# Patient Record
(demographics unavailable — no encounter records)

---

## 2024-10-10 NOTE — REVIEW OF SYSTEMS
[Xerostomia: Grade 0] : Xerostomia: Grade 0 [Oral Pain: Grade 0] : Oral Pain: Grade 0 [Dysgeusia: Grade 0] : Dysgeusia: Grade 0 [Headache: Grade 0] : Headache: Grade 0 [Lethargy: Grade 0] : Lethargy: Grade 0 [Cough: Grade 0] : Cough: Grade 0 [Dyspnea: Grade 0] : Dyspnea: Grade 0 [Hoarseness: Grade 0] : Hoarseness: Grade 0 [Skin Hyperpigmentation: Grade 0] : Skin Hyperpigmentation: Grade 0 [Dermatitis Radiation: Grade 0] : Dermatitis Radiation: Grade 0 [Negative] : Allergic/Immunologic [Tinnitus: Grade 1 - Mild symptoms; intervention not indicated] : Tinnitus: Grade 1 - Mild symptoms; intervention not indicated

## 2024-10-16 NOTE — HISTORY OF PRESENT ILLNESS
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   Patient first noticed a firm raised lesion on the left ear in fall 2023 that had occasional bleeding.  11/6/2023 2 stage Mohs procedure biopsy of left antihelix final path: squamous cell carcinoma Class 1 with PNI. Margins were negative. Need official report in chart.  1/25/24 CXR [facility] 1/25/24 neck US: IMPRESSION: Unremarkable soft tissue neck ultrasound  1/30/24 initial radiation consult: Patient doing well this visit, no major complaints.  2/1/2024 CT Neck Soft Tissue: Postoperative change at left auricle along antitragus, without nodular enhancement of concern. No cervical lymphadenopathy.  4/9/2024 Completed planned RT to left ear total dose of 5000cGy in 20 fractions.   9/30/2024 CT Neck Soft Tissue: IMPRESSION: Stable interval follow-up CT examination without evidence of neoplastic disease progression nor recurrence. No new or enlarging cervical lymph nodes.  9/30/2024 CT Chest: IMPRESSION: Stable exam  10/10/24 Presents today in follow up. Patient noted occasional tinnitus, and pressure in left ear since last month. Denies pain. Generally doing well.

## 2025-04-03 NOTE — REVIEW OF SYSTEMS
[Negative] : Allergic/Immunologic [Tinnitus: Grade 1 - Mild symptoms; intervention not indicated] : Tinnitus: Grade 1 - Mild symptoms; intervention not indicated [Xerostomia: Grade 0] : Xerostomia: Grade 0 [Oral Pain: Grade 0] : Oral Pain: Grade 0 [Dysgeusia: Grade 0] : Dysgeusia: Grade 0 [Headache: Grade 0] : Headache: Grade 0 [Lethargy: Grade 0] : Lethargy: Grade 0 [Cough: Grade 0] : Cough: Grade 0 [Dyspnea: Grade 0] : Dyspnea: Grade 0 [Hoarseness: Grade 0] : Hoarseness: Grade 0 [Skin Hyperpigmentation: Grade 0] : Skin Hyperpigmentation: Grade 0 [Dermatitis Radiation: Grade 0] : Dermatitis Radiation: Grade 0

## 2025-04-10 NOTE — HISTORY OF PRESENT ILLNESS
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   Patient first noticed a firm raised lesion on the left ear in fall 2023 that had occasional bleeding.  11/6/2023 2 stage Mohs procedure biopsy of left antihelix final path: squamous cell carcinoma Class 1 with PNI. Margins were negative. Need official report in chart.  1/25/24 CXR [facility] 1/25/24 neck US: IMPRESSION: Unremarkable soft tissue neck ultrasound  1/30/24 initial radiation consult: Patient doing well this visit, no major complaints.  2/1/2024 CT Neck Soft Tissue: Postoperative change at left auricle along antitragus, without nodular enhancement of concern. No cervical lymphadenopathy.  4/9/2024 Completed planned RT to left ear total dose of 5000cGy in 20 fractions.   9/30/2024 CT Neck Soft Tissue: IMPRESSION: Stable interval follow-up CT examination without evidence of neoplastic disease progression nor recurrence. No new or enlarging cervical lymph nodes.  9/30/2024 CT Chest: IMPRESSION: Stable exam  4/02/25 CT neck: Stable exam with no evidence of recurrent lesion at the primary site along the left pina/ periauricular  soft tissue. No new or enlarging lymphadenopathy.  4/2/2025 CT chest: Stable chest CT. For complete evaluation of the neck, please refer to dedicated CT performed on the same day.  4/10/25 Presents today in follow up. Feeling well. Notes he was having some intermittent ear pressure and pain and intermittent hearing loss that resolved after a course of steroids. Has had some intentional weight loss recently.